# Patient Record
Sex: MALE | Race: WHITE | Employment: FULL TIME | URBAN - METROPOLITAN AREA
[De-identification: names, ages, dates, MRNs, and addresses within clinical notes are randomized per-mention and may not be internally consistent; named-entity substitution may affect disease eponyms.]

---

## 2024-10-08 RX ORDER — HYDROXYZINE HYDROCHLORIDE 25 MG/1
25 TABLET, FILM COATED ORAL EVERY 8 HOURS PRN
COMMUNITY
Start: 2024-06-12

## 2024-10-08 RX ORDER — SPIRONOLACTONE 50 MG/1
50 TABLET, FILM COATED ORAL DAILY
COMMUNITY
Start: 2024-08-22

## 2024-10-08 RX ORDER — ALBUTEROL SULFATE 90 UG/1
2 INHALANT RESPIRATORY (INHALATION) PRN
COMMUNITY
Start: 2023-12-10

## 2024-10-08 RX ORDER — FUROSEMIDE 20 MG/1
20 TABLET ORAL DAILY
COMMUNITY
Start: 2024-08-22

## 2024-10-08 RX ORDER — PHENOL 1.4 %
1 AEROSOL, SPRAY (ML) MUCOUS MEMBRANE
COMMUNITY

## 2024-10-08 RX ORDER — NADOLOL 20 MG/1
20 TABLET ORAL DAILY
COMMUNITY
Start: 2024-08-22 | End: 2024-11-20

## 2024-10-09 ENCOUNTER — OFFICE VISIT (OUTPATIENT)
Age: 39
End: 2024-10-09
Payer: COMMERCIAL

## 2024-10-09 VITALS
HEIGHT: 71 IN | WEIGHT: 315 LBS | BODY MASS INDEX: 44.1 KG/M2 | SYSTOLIC BLOOD PRESSURE: 136 MMHG | TEMPERATURE: 97.2 F | DIASTOLIC BLOOD PRESSURE: 92 MMHG | OXYGEN SATURATION: 98 % | HEART RATE: 78 BPM

## 2024-10-09 DIAGNOSIS — K70.9 ALCOHOL INDUCED LIVER DISORDER (HCC): ICD-10-CM

## 2024-10-09 DIAGNOSIS — K70.30 ALCOHOLIC CIRRHOSIS OF LIVER WITHOUT ASCITES (HCC): Primary | ICD-10-CM

## 2024-10-09 DIAGNOSIS — F10.91 ALCOHOL USE DISORDER IN REMISSION: ICD-10-CM

## 2024-10-09 PROCEDURE — 99204 OFFICE O/P NEW MOD 45 MIN: CPT | Performed by: INTERNAL MEDICINE

## 2024-10-09 RX ORDER — LACTULOSE 10 G/15ML
SOLUTION ORAL; RECTAL
COMMUNITY
Start: 2024-08-05

## 2024-10-09 NOTE — PROGRESS NOTES
[facility]     Ben Dudley MD, FACP, [facility], FAASLD   MD Lilliana Ortega, PA-RODRIGUE Hoyt, [facility]   Sindy Simons, [facility]   Kailey Andersonosta, [facility]  Eder Colby, [facility]   Therese Acevedo, [facility]   Haylee Calderon, [facility]-BC      Liver [facility]   [address], [address]   [place]  [address] [phone]   FAX: [phone]  [facility]   [address], [address]   [place]  [address] [phone]   FAX: [phone]       Patient Care Team:  Shonna Johnson APRN - NP as PCP - General (Nurse Practitioner)  Ben Dudley MD (Hepatology)      Patient Active Problem List   Diagnosis    Cirrhosis (HCC)    Alcohol use disorder in remission    Alcohol induced liver disorder (HCC)    Ascites    Edema       The clinicians listed above have asked me to see Edgar No in consultation regarding management of cirrhosis that is presumed   secondary to Alcohol Induced Liver Disorder.      All medical records sent by the referring physicians were reviewed including imaging studies     The patient is a 39 y.o. male who was found to have cirrhosis whenn he was hospitalized in [facility] for weakness, abdominal swelling in 2022    The patient had been consuming alcohol in excess.  The quantity is unknown    The patient has been abstinent from all alcohol since 2022.    An assessment of liver fibrosis with biopsy or elastography has not been performed.      An EGD in 2023 was reported to be normal.    Serologic evaluation for markers of chronic liver disease was negative for HCV    The patient has developed the following complications of cirrhosis: ascites, edema,     In the office today the patient has the following symptoms:  The patient feels well and has no

## 2024-10-19 ENCOUNTER — HOSPITAL ENCOUNTER (OUTPATIENT)
Facility: HOSPITAL | Age: 39
Discharge: HOME | End: 2024-10-22
Attending: INTERNAL MEDICINE
Payer: COMMERCIAL

## 2024-10-19 DIAGNOSIS — K70.30 ALCOHOLIC CIRRHOSIS OF LIVER WITHOUT ASCITES (HCC): ICD-10-CM

## 2024-10-19 PROCEDURE — 76705 ECHO EXAM OF ABDOMEN: CPT

## 2024-10-27 PROBLEM — R18.8 ASCITES: Status: ACTIVE | Noted: 2024-10-27

## 2024-10-27 PROBLEM — K74.60 CIRRHOSIS (HCC): Status: ACTIVE | Noted: 2024-10-27

## 2024-10-27 PROBLEM — F10.91 ALCOHOL USE DISORDER IN REMISSION: Chronic | Status: ACTIVE | Noted: 2024-10-27

## 2024-10-27 PROBLEM — R60.9 EDEMA: Status: ACTIVE | Noted: 2024-10-27

## 2024-10-27 PROBLEM — K70.9 ALCOHOL INDUCED LIVER DISORDER (HCC): Status: ACTIVE | Noted: 2024-10-27

## 2024-10-27 PROBLEM — F10.91 ALCOHOL USE DISORDER IN REMISSION: Status: ACTIVE | Noted: 2024-10-27

## 2024-10-31 ENCOUNTER — TELEPHONE (OUTPATIENT)
Age: 39
End: 2024-10-31

## 2024-10-31 NOTE — TELEPHONE ENCOUNTER
Per MLS \"Liver fjuction is all normal.  FU 1 month as scheduled \"    LMV[facility]Mail advising as above    efs

## 2024-11-13 ENCOUNTER — PREP FOR PROCEDURE (OUTPATIENT)
Age: 39
End: 2024-11-13

## 2024-11-13 ENCOUNTER — HOSPITAL ENCOUNTER (OUTPATIENT)
Facility: HOSPITAL | Age: 39
Setting detail: SPECIMEN
Discharge: HOME | End: 2024-11-16
Payer: COMMERCIAL

## 2024-11-13 ENCOUNTER — OFFICE VISIT (OUTPATIENT)
Age: 39
End: 2024-11-13
Payer: COMMERCIAL

## 2024-11-13 VITALS
SYSTOLIC BLOOD PRESSURE: 124 MMHG | DIASTOLIC BLOOD PRESSURE: 81 MMHG | OXYGEN SATURATION: 99 % | HEIGHT: 71 IN | TEMPERATURE: 97 F | BODY MASS INDEX: 44.1 KG/M2 | HEART RATE: 67 BPM | WEIGHT: 315 LBS

## 2024-11-13 DIAGNOSIS — K70.30 ALCOHOLIC CIRRHOSIS, UNSPECIFIED WHETHER ASCITES PRESENT (HCC): Primary | ICD-10-CM

## 2024-11-13 DIAGNOSIS — K70.30 ALCOHOLIC CIRRHOSIS, UNSPECIFIED WHETHER ASCITES PRESENT (HCC): ICD-10-CM

## 2024-11-13 LAB
ALBUMIN SERPL-MCNC: 3.8 G/DL (ref 3.4–5)
ALBUMIN/GLOB SERPL: 1.1 (ref 0.8–1.7)
ALP SERPL-CCNC: 80 U/L (ref 45–117)
ALT SERPL-CCNC: 28 U/L (ref 16–61)
ANION GAP SERPL CALC-SCNC: 5 MMOL/L (ref 3–18)
AST SERPL-CCNC: 15 U/L (ref 10–38)
BASOPHILS # BLD: 0.1 K/UL (ref 0–0.1)
BASOPHILS NFR BLD: 1 % (ref 0–2)
BILIRUB DIRECT SERPL-MCNC: 0.2 MG/DL (ref 0–0.2)
BILIRUB SERPL-MCNC: 0.4 MG/DL (ref 0.2–1)
BUN SERPL-MCNC: 12 MG/DL (ref 7–18)
BUN/CREAT SERPL: 14 (ref 12–20)
CALCIUM SERPL-MCNC: 8.8 MG/DL (ref 8.5–10.1)
CHLORIDE SERPL-SCNC: 106 MMOL/L (ref 100–111)
CO2 SERPL-SCNC: 27 MMOL/L (ref 21–32)
CREAT SERPL-MCNC: 0.88 MG/DL (ref 0.6–1.3)
DIFFERENTIAL METHOD BLD: NORMAL
EOSINOPHIL # BLD: 0.2 K/UL (ref 0–0.4)
EOSINOPHIL NFR BLD: 3 % (ref 0–5)
ERYTHROCYTE [DISTWIDTH] IN BLOOD BY AUTOMATED COUNT: 13.2 % (ref 11.6–14.5)
GLOBULIN SER CALC-MCNC: 3.5 G/DL (ref 2–4)
GLUCOSE SERPL-MCNC: 97 MG/DL (ref 74–99)
HCT VFR BLD AUTO: 40.9 % (ref 36–48)
HGB BLD-MCNC: 13.2 G/DL (ref 13–16)
IMM GRANULOCYTES # BLD AUTO: 0 K/UL (ref 0–0.04)
IMM GRANULOCYTES NFR BLD AUTO: 0 % (ref 0–0.5)
INR PPP: 1 (ref 0.9–1.1)
LYMPHOCYTES # BLD: 1.9 K/UL (ref 0.9–3.6)
LYMPHOCYTES NFR BLD: 27 % (ref 21–52)
MCH RBC QN AUTO: 28.1 PG (ref 24–34)
MCHC RBC AUTO-ENTMCNC: 32.3 G/DL (ref 31–37)
MCV RBC AUTO: 87.2 FL (ref 78–100)
MONOCYTES # BLD: 0.5 K/UL (ref 0.05–1.2)
MONOCYTES NFR BLD: 8 % (ref 3–10)
NEUTS SEG # BLD: 4.4 K/UL (ref 1.8–8)
NEUTS SEG NFR BLD: 62 % (ref 40–73)
NRBC # BLD: 0 K/UL (ref 0–0.01)
NRBC BLD-RTO: 0 PER 100 WBC
PLATELET # BLD AUTO: 151 K/UL (ref 135–420)
PMV BLD AUTO: 10.5 FL (ref 9.2–11.8)
POTASSIUM SERPL-SCNC: 4.2 MMOL/L (ref 3.5–5.5)
PROT SERPL-MCNC: 7.3 G/DL (ref 6.4–8.2)
PROTHROMBIN TIME: 13.2 SEC (ref 11.9–14.9)
RBC # BLD AUTO: 4.69 M/UL (ref 4.35–5.65)
SODIUM SERPL-SCNC: 138 MMOL/L (ref 136–145)
WBC # BLD AUTO: 7.2 K/UL (ref 4.6–13.2)

## 2024-11-13 PROCEDURE — 82107 ALPHA-FETOPROTEIN L3: CPT

## 2024-11-13 PROCEDURE — 85610 PROTHROMBIN TIME: CPT

## 2024-11-13 PROCEDURE — 36415 COLL VENOUS BLD VENIPUNCTURE: CPT

## 2024-11-13 PROCEDURE — 80076 HEPATIC FUNCTION PANEL: CPT

## 2024-11-13 PROCEDURE — 80048 BASIC METABOLIC PNL TOTAL CA: CPT

## 2024-11-13 PROCEDURE — 85025 COMPLETE CBC W/AUTO DIFF WBC: CPT

## 2024-11-13 PROCEDURE — 99214 OFFICE O/P EST MOD 30 MIN: CPT | Performed by: NURSE PRACTITIONER

## 2024-11-13 RX ORDER — LACTULOSE 10 G/15ML
SOLUTION ORAL
COMMUNITY
Start: 2024-11-10 | End: 2024-11-18 | Stop reason: ALTCHOICE

## 2024-11-13 NOTE — PROGRESS NOTES
[facility]     Ben Dudley MD, FACP, [facility], FAASLD   MD Lilliana Ortega, PA-RODRIGUE Hoyt, [facility]   Sindy Simons, [facility]   Kailey Andersonosta, [facility]-  Eder Colby, [facility]   Therese Acevedo, [facility]   Haylee Calderon, [facility]   at [facility]   [address], [address]   [place]  [address] [phone]   FAX: [phone]  [facility]   [address], [address]   [place]  [address] [phone]   FAX: [phone]     Patient Care Team:  Shonna Johnson APRN - NP as PCP - General (Nurse Practitioner)  Ben Dudley MD (Hepatology)    Patient Active Problem List   Diagnosis    Cirrhosis (HCC)    Alcohol use disorder in remission    Ascites    Edema     Edgar No is being seen at [facility] for management of cirrhosis that   appears to be secondary to alcohol. The active problem list, all pertinent past medical history, medications, liver histology, endoscopic studies, radiologic findings, and laboratory findings related to the liver disorder were reviewed and discussed with the patient.      The patient is a 39 y.o. male who was found to have cirrhosis whenn he was hospitalized in [facility] for weakness, abdominal swelling in 2022    The patient had been consuming alcohol in excess.  The quantity is unknown    The patient has been abstinent from all alcohol since 2022.    The most recent imaging of the liver was Ultrasound performed in 10/2024. Results suggest cirrhosis. No liver mass lesions noted.    An EGD in 2023 was reported to be normal.    Serologic evaluation for markers of chronic liver disease was negative for HCV    The patient has developed the following complications of cirrhosis: ascites, edema,     The patient does

## 2024-11-14 ENCOUNTER — TELEPHONE (OUTPATIENT)
Age: 39
End: 2024-11-14

## 2024-11-14 NOTE — TELEPHONE ENCOUNTER
----- Message from JSOÉ MIGUEL Webster NP sent at 11/14/2024 10:22 AM EST -----  Labs reviewed. All labs were normal. Liver function is normal.

## 2024-11-21 LAB
AFP L3 MFR SERPL: NORMAL % (ref 0–9.9)
AFP SERPL-MCNC: 5.3 NG/ML (ref 0–6.9)

## 2025-03-06 ENCOUNTER — PATIENT MESSAGE (OUTPATIENT)
Age: 40
End: 2025-03-06

## 2025-03-06 DIAGNOSIS — F10.91 ALCOHOL USE DISORDER IN REMISSION: Primary | ICD-10-CM

## 2025-03-06 DIAGNOSIS — K70.30 ALCOHOLIC CIRRHOSIS OF LIVER WITHOUT ASCITES (HCC): ICD-10-CM

## 2025-03-06 RX ORDER — NADOLOL 20 MG/1
20 TABLET ORAL DAILY
Qty: 30 TABLET | Refills: 2 | Status: SHIPPED | OUTPATIENT
Start: 2025-03-06 | End: 2025-06-04

## 2025-04-09 DIAGNOSIS — F10.91 ALCOHOL USE DISORDER IN REMISSION: ICD-10-CM

## 2025-04-09 DIAGNOSIS — K70.30 ALCOHOLIC CIRRHOSIS OF LIVER WITHOUT ASCITES (HCC): ICD-10-CM

## 2025-04-09 RX ORDER — NADOLOL 20 MG/1
20 TABLET ORAL DAILY
Qty: 90 TABLET | Refills: 3 | Status: SHIPPED | OUTPATIENT
Start: 2025-04-09 | End: 2026-04-04